# Patient Record
Sex: FEMALE | Race: OTHER | NOT HISPANIC OR LATINO | URBAN - METROPOLITAN AREA
[De-identification: names, ages, dates, MRNs, and addresses within clinical notes are randomized per-mention and may not be internally consistent; named-entity substitution may affect disease eponyms.]

---

## 2022-04-08 VITALS
HEART RATE: 71 BPM | TEMPERATURE: 98 F | WEIGHT: 175.71 LBS | DIASTOLIC BLOOD PRESSURE: 65 MMHG | RESPIRATION RATE: 16 BRPM | HEIGHT: 66 IN | OXYGEN SATURATION: 99 % | SYSTOLIC BLOOD PRESSURE: 102 MMHG

## 2022-04-08 RX ORDER — POVIDONE-IODINE 5 %
1 AEROSOL (ML) TOPICAL ONCE
Refills: 0 | Status: COMPLETED | OUTPATIENT
Start: 2022-04-11 | End: 2022-04-11

## 2022-04-08 NOTE — H&P ADULT - PROBLEM SELECTOR PLAN 1
Admit to Orthopaedic Service.  Presents today for elective Far lateral right L5-S1 laminectomy   Pt medically stable and cleared for procedure today by Dr. Menon

## 2022-04-08 NOTE — H&P ADULT - HISTORY OF PRESENT ILLNESS
46F c/o low back pain x       Present for elective far lateral right laminectomy L5-S1  46F c/o low back pain s/p twisting injury on 3/11 worsened over time without improvement. Failed conservative treatments. Patient has numbness and weakness in the right leg. Ambulates without assist. Pt denies any recent fevers/chills, chest pain, body aches, shortness of breath, sick contacts. Denies history of DVT/PE. Presents today for elective far lateral right laminectomy L5-S1.

## 2022-04-08 NOTE — H&P ADULT - NSHPADDITIONALINFOADULT_GEN_ALL_CORE
***Notes/documentation in this chart by others:  Hospital policy requires me to provide "Attestation" statements and electronic signatures in this electronic note and/or elsewhere in this electronic chart.  However, the contents of this note, and/or documentation and/or notes by others in this chart/electronic record, have not been reviewed for accuracy.  Hospital policy requires me to provide attestations and electronic signatures  as well as to "agree with the history, physical exam and plan as documented by..." others.  However, it is not my usual and customary practice to review for accuracy and/or edit for accuracy the documentation and/or notes of others. In addition, unless I specifically provide documentation otherwise, I was not present during the history taking and examination by others in this medical record.  As a result, even thought I have provided the required attestations and electronic signatures in this chart, I cannot attest to the accuracy or contents of the notes and/or documentation of this note or by others in this electronic record.  Janusz Sagastume MD

## 2022-04-08 NOTE — H&P ADULT - NSHPPHYSICALEXAM_GEN_ALL_CORE
MSK: + decreased ROM 2/2 pain, lumbosacral spine       Remainder of exam per medical clearance note NAD, sitting comfortably in chair   MSK: decreased ROM of lumbar spine secondary to pain, decreased sensation RLE, SILT LLE, EHL/TA 4/5 RLE, FHL/GS 5/5 RLE, EHL/FHL/TA/GS 5/5 LLE     Rest of PE per medical clearance

## 2022-04-08 NOTE — H&P ADULT - NSHPLABSRESULTS_GEN_ALL_CORE
Preop CBC, BMP, PT/PTT/INR, UA - WNL per medical clearance   Cr .74  Preop CXR - WNL per medical clearance   Preop EKG - NSR - WNL per medical clearance    DOS

## 2022-04-08 NOTE — ASU PREOP CHECKLIST - WEIGHT IN LBS
I have reviewed the notes, assessments, and/or procedures performed by the resident, I concur with her/his documentation of Moe Rincon  175.7

## 2022-04-08 NOTE — H&P ADULT - NSICDXPASTSURGICALHX_GEN_ALL_CORE_FT
PAST SURGICAL HISTORY:  Elective surgery ACL reconstruction left knee, 2005, Left knee meniscus repair, 2016    History of ankle surgery Right ankle fx

## 2022-04-11 ENCOUNTER — OUTPATIENT (OUTPATIENT)
Dept: INPATIENT UNIT | Facility: HOSPITAL | Age: 47
LOS: 1 days | Discharge: HOME | End: 2022-04-11
Payer: COMMERCIAL

## 2022-04-11 VITALS
RESPIRATION RATE: 21 BRPM | HEART RATE: 84 BPM | SYSTOLIC BLOOD PRESSURE: 103 MMHG | OXYGEN SATURATION: 100 % | DIASTOLIC BLOOD PRESSURE: 57 MMHG

## 2022-04-11 DIAGNOSIS — Z41.9 ENCOUNTER FOR PROCEDURE FOR PURPOSES OTHER THAN REMEDYING HEALTH STATE, UNSPECIFIED: Chronic | ICD-10-CM

## 2022-04-11 DIAGNOSIS — M48.07 SPINAL STENOSIS, LUMBOSACRAL REGION: ICD-10-CM

## 2022-04-11 DIAGNOSIS — Z98.890 OTHER SPECIFIED POSTPROCEDURAL STATES: Chronic | ICD-10-CM

## 2022-04-11 PROCEDURE — 88311 DECALCIFY TISSUE: CPT

## 2022-04-11 PROCEDURE — 63056 DECOMPRESS SPINAL CORD LMBR: CPT

## 2022-04-11 PROCEDURE — 97161 PT EVAL LOW COMPLEX 20 MIN: CPT

## 2022-04-11 PROCEDURE — 76000 FLUOROSCOPY <1 HR PHYS/QHP: CPT

## 2022-04-11 PROCEDURE — 88304 TISSUE EXAM BY PATHOLOGIST: CPT

## 2022-04-11 RX ORDER — APREPITANT 80 MG/1
40 CAPSULE ORAL ONCE
Refills: 0 | Status: COMPLETED | OUTPATIENT
Start: 2022-04-11 | End: 2022-04-11

## 2022-04-11 RX ORDER — SODIUM CHLORIDE 9 MG/ML
1000 INJECTION, SOLUTION INTRAVENOUS
Refills: 0 | Status: DISCONTINUED | OUTPATIENT
Start: 2022-04-11 | End: 2022-04-11

## 2022-04-11 RX ORDER — OXYCODONE HYDROCHLORIDE 5 MG/1
10 TABLET ORAL EVERY 4 HOURS
Refills: 0 | Status: DISCONTINUED | OUTPATIENT
Start: 2022-04-11 | End: 2022-04-11

## 2022-04-11 RX ORDER — HYDROMORPHONE HYDROCHLORIDE 2 MG/ML
0.5 INJECTION INTRAMUSCULAR; INTRAVENOUS; SUBCUTANEOUS ONCE
Refills: 0 | Status: DISCONTINUED | OUTPATIENT
Start: 2022-04-11 | End: 2022-04-11

## 2022-04-11 RX ORDER — ONDANSETRON 8 MG/1
4 TABLET, FILM COATED ORAL ONCE
Refills: 0 | Status: DISCONTINUED | OUTPATIENT
Start: 2022-04-11 | End: 2022-04-11

## 2022-04-11 RX ORDER — GABAPENTIN 400 MG/1
300 CAPSULE ORAL ONCE
Refills: 0 | Status: COMPLETED | OUTPATIENT
Start: 2022-04-11 | End: 2022-04-11

## 2022-04-11 RX ORDER — OXYCODONE HYDROCHLORIDE 5 MG/1
5 TABLET ORAL EVERY 4 HOURS
Refills: 0 | Status: DISCONTINUED | OUTPATIENT
Start: 2022-04-11 | End: 2022-04-11

## 2022-04-11 RX ORDER — LORATADINE 10 MG/1
0 TABLET ORAL
Qty: 0 | Refills: 0 | DISCHARGE

## 2022-04-11 RX ORDER — CHLORHEXIDINE GLUCONATE 213 G/1000ML
1 SOLUTION TOPICAL ONCE
Refills: 0 | Status: COMPLETED | OUTPATIENT
Start: 2022-04-11 | End: 2022-04-11

## 2022-04-11 RX ORDER — LORATADINE 10 MG/1
1 TABLET ORAL
Qty: 0 | Refills: 0 | DISCHARGE

## 2022-04-11 RX ORDER — ACETAMINOPHEN 500 MG
975 TABLET ORAL EVERY 8 HOURS
Refills: 0 | Status: DISCONTINUED | OUTPATIENT
Start: 2022-04-11 | End: 2022-04-11

## 2022-04-11 RX ORDER — GABAPENTIN 400 MG/1
1 CAPSULE ORAL
Qty: 30 | Refills: 0
Start: 2022-04-11 | End: 2022-05-10

## 2022-04-11 RX ORDER — ACETAMINOPHEN 500 MG
1000 TABLET ORAL ONCE
Refills: 0 | Status: COMPLETED | OUTPATIENT
Start: 2022-04-11 | End: 2022-04-11

## 2022-04-11 RX ADMIN — APREPITANT 40 MILLIGRAM(S): 80 CAPSULE ORAL at 07:09

## 2022-04-11 RX ADMIN — GABAPENTIN 300 MILLIGRAM(S): 400 CAPSULE ORAL at 07:08

## 2022-04-11 RX ADMIN — Medication 1 APPLICATION(S): at 07:15

## 2022-04-11 RX ADMIN — Medication 975 MILLIGRAM(S): at 14:58

## 2022-04-11 RX ADMIN — OXYCODONE HYDROCHLORIDE 5 MILLIGRAM(S): 5 TABLET ORAL at 14:58

## 2022-04-11 RX ADMIN — CHLORHEXIDINE GLUCONATE 1 APPLICATION(S): 213 SOLUTION TOPICAL at 07:15

## 2022-04-11 RX ADMIN — OXYCODONE HYDROCHLORIDE 5 MILLIGRAM(S): 5 TABLET ORAL at 13:48

## 2022-04-11 RX ADMIN — Medication 1000 MILLIGRAM(S): at 07:09

## 2022-04-11 RX ADMIN — Medication 975 MILLIGRAM(S): at 15:38

## 2022-04-11 NOTE — PHYSICAL THERAPY INITIAL EVALUATION ADULT - PERTINENT HX OF CURRENT PROBLEM, REHAB EVAL
46F c/o low back pain s/p twisting injury on 3/11 worsened over time without improvement. Failed conservative treatments. Patient has numbness and weakness in the right leg. Ambulates without assist.

## 2022-04-11 NOTE — PROGRESS NOTE ADULT - SUBJECTIVE AND OBJECTIVE BOX
Patient seen, examined. Her examination shows 2/5 extensor hallucis longus (cannot lift the toe beyond neutral) and 3/5 dorsiflexion and eversion and inversion and knee flexion.  Knee extension is 5/5.    Positive straight leg on the right side.    The condition and treatment options were discussed with the patient.    The risks, benefits and alternatives to far lateral L5-S1 microdiscectomy was discussed.   The primary goal of surgery is to relieve radicular symptoms and maximize neurological recovery, while understanding that no guarantee can be made for full motor recovery. The prognosis of the foot drop both with and without surgery were discussed. The complications of surgery were discussed and include, but are not limited to, wound problems, infection, bleeding, vascular injury, nerve injury, paralysis, dural defect/cerebrospinal fluid leak, instability, need for further surgery of any kind including but not limited to revision discectomy and/or fusion/instrumention, recurrent disc herniation (5-10%), persistent radicular symptoms and/or pain and/or weakness, deep vein thrombosis, pulmonary embolus, myocardial infarction, stroke and death.  All questions were answered, the patient acknowledges understanding all of the above and informed consent was obtained.      ******Notes/documentation by others:  Despite the "electronic signature(s)" I provide in this electronic note and/or elsewhere in this electronic chart, the contents of this note, and/or documentation and/or notes by others in this chart and/or electronic record have not been reviewed and not been proofread for accuracy.  Hospital policy requires me to provide electronic signatures in the medical record and to "agree with the history, physical exam and plan as documented by..." others, but it is not my usual and customary practice to review the documentation and/or notes of others.  As a result, despite the mandatory signature(s) in this chart, I cannot attest to the accuracy or contents of the notes and/or documentation of others.  PAU

## 2022-04-11 NOTE — ASU DISCHARGE PLAN (ADULT/PEDIATRIC) - PAIN MANAGEMENT
sent to [facility] pharmacy/Prescriptions electronically submitted to pharmacy from [facility]

## 2022-04-11 NOTE — PHYSICAL THERAPY INITIAL EVALUATION ADULT - GAIT DEVIATIONS NOTED, PT EVAL
R trendelenberg, R foot drop, slight latera; sway without AD, increased steadiness with straight cane, slight dizziness however VSS

## 2022-04-11 NOTE — PROGRESS NOTE ADULT - ASSESSMENT
Spoke with patient preoperatively and she is aware I will be assisting during today's surgery and disclosure performed.

## 2022-04-11 NOTE — PACU DISCHARGE NOTE - COMMENTS
Pt A&ox4, operative site clean, dry and intact; reports pain controlled at present. Pt successfully passed physical therapy. Plan of care and discharge instructions reviewed with verbalized understanding

## 2022-04-11 NOTE — ASU DISCHARGE PLAN (ADULT/PEDIATRIC) - ASU DC SPECIAL INSTRUCTIONSFT
No strenuous activity (bending/twisting), heavy lifting, driving or returning to work until cleared by MD.  May shower POD#2, keep dressing clean and dry, change as needed for saturation.   Try to have regular bowel movements, take stool softener or laxative if necessary.  Call to schedule an appt with Dr. Sagastume for follow up, if you have staples or sutures they will be removed in office.  Contact your doctor if you experience: fever greater than 101.5, chills, chest pain, difficulty breathing, redness or excessive drainage around the incision, other concerns.  Follow up with your primary care provider.

## 2022-04-11 NOTE — ASU DISCHARGE PLAN (ADULT/PEDIATRIC) - NS MD DC FALL RISK RISK
For information on Fall & Injury Prevention, visit: [email or web address] OR  [email or web address] OR  https://www.cdc.gov/steadi/patient.html

## 2022-04-15 LAB — SURGICAL PATHOLOGY STUDY: SIGNIFICANT CHANGE UP

## (undated) DEVICE — STAPLER SKIN PROXIMATE

## (undated) DEVICE — DRAPE 3/4 SHEET 52X76"

## (undated) DEVICE — WARMING BLANKET UPPER ADULT

## (undated) DEVICE — NDL SPINAL 18G X 3.5" (PINK)

## (undated) DEVICE — PREP DURAPREP 26CC

## (undated) DEVICE — DRAPE 1/2 SHEET 40X57"

## (undated) DEVICE — DRAPE BACK TABLE COVER 80X90"

## (undated) DEVICE — MIDAS REX LEGEND BALL FLUTED LG BORE 5.0MM X 14CM

## (undated) DEVICE — VENODYNE/SCD SLEEVE CALF MEDIUM

## (undated) DEVICE — GLV 8 PROTEXIS (CREAM) NEU-THERA

## (undated) DEVICE — PREP CHLOROHEXIDINE 4% 118CC KIT

## (undated) DEVICE — GLV 7.5 PROTEXIS (WHITE)

## (undated) DEVICE — PACK SPINE

## (undated) DEVICE — SUT MONOCRYL 3-0 18" PS-1

## (undated) DEVICE — SUT VICRYL 2-0 27" CT-1 UNDYED

## (undated) DEVICE — MIDAS REX LEGEND MATCH HEAD FLUTED LG BORE 3.0MM X 14CM

## (undated) DEVICE — SUT VICRYL 2-0 27" SH UNDYED